# Patient Record
Sex: MALE | Race: BLACK OR AFRICAN AMERICAN | URBAN - METROPOLITAN AREA
[De-identification: names, ages, dates, MRNs, and addresses within clinical notes are randomized per-mention and may not be internally consistent; named-entity substitution may affect disease eponyms.]

---

## 2023-04-05 ENCOUNTER — OFFICE VISIT (OUTPATIENT)
Dept: URBAN - METROPOLITAN AREA CLINIC 6 | Facility: CLINIC | Age: 58
End: 2023-04-05
Payer: OTHER GOVERNMENT

## 2023-04-05 DIAGNOSIS — T15.02XA FOREIGN BODY IN CORNEA, LEFT EYE: Primary | ICD-10-CM

## 2023-04-05 PROCEDURE — 92002 INTRM OPH EXAM NEW PATIENT: CPT | Performed by: OPTOMETRIST

## 2023-04-05 RX ORDER — TOBRAMYCIN 3 MG/ML
0.3 % SOLUTION/ DROPS OPHTHALMIC
Qty: 5 | Refills: 0 | Status: ACTIVE
Start: 2023-04-05

## 2023-04-05 ASSESSMENT — INTRAOCULAR PRESSURE
OD: 14
OS: 12
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
## 2023-04-10 ENCOUNTER — OFFICE VISIT (OUTPATIENT)
Dept: URBAN - METROPOLITAN AREA CLINIC 6 | Facility: CLINIC | Age: 58
End: 2023-04-10
Payer: OTHER GOVERNMENT

## 2023-04-10 PROCEDURE — 92012 INTRM OPH EXAM EST PATIENT: CPT | Performed by: OPTOMETRIST

## 2023-04-10 RX ORDER — NEOMYCIN SULFATE, POLYMYXIN B SULFATE AND DEXAMETHASONE 3.5; 10000; 1 MG/G; [USP'U]/G; MG/G
OINTMENT OPHTHALMIC
Qty: 3.5 | Refills: 0 | Status: ACTIVE
Start: 2023-04-10

## 2023-04-10 ASSESSMENT — INTRAOCULAR PRESSURE
OD: 10
OS: 13

## 2023-04-10 NOTE — IMPRESSION/PLAN
Impression: Foreign body in cornea, left eye, subsequent encounter: T15.02XD. Plan: Corneal foreign body LT eye: healing seam centrally. BCL removed. Start maxitrol SAGAR QID OS. Discussed the importance of safety goggles.

## 2023-04-13 ENCOUNTER — OFFICE VISIT (OUTPATIENT)
Dept: URBAN - METROPOLITAN AREA CLINIC 6 | Facility: CLINIC | Age: 58
End: 2023-04-13
Payer: OTHER GOVERNMENT

## 2023-04-13 DIAGNOSIS — T15.02XD FOREIGN BODY IN CORNEA, LEFT EYE, SUBSEQUENT ENCOUNTER: Primary | ICD-10-CM

## 2023-04-13 PROCEDURE — 92012 INTRM OPH EXAM EST PATIENT: CPT | Performed by: OPTOMETRIST

## 2023-04-13 ASSESSMENT — INTRAOCULAR PRESSURE
OD: 10
OS: 13

## 2023-04-13 NOTE — IMPRESSION/PLAN
Impression: Foreign body in cornea, left eye, subsequent encounter: T15.02XD. Plan: Corneal foreign body LT eye: healing seam centrally. Improving. Continue maxitrol SAGAR TID OS. Recheck in 1 week.

## 2023-04-20 ENCOUNTER — OFFICE VISIT (OUTPATIENT)
Dept: URBAN - METROPOLITAN AREA CLINIC 6 | Facility: CLINIC | Age: 58
End: 2023-04-20
Payer: OTHER GOVERNMENT

## 2023-04-20 DIAGNOSIS — T15.02XD FOREIGN BODY IN CORNEA, LEFT EYE, SUBSEQUENT ENCOUNTER: Primary | ICD-10-CM

## 2023-04-20 PROCEDURE — 92012 INTRM OPH EXAM EST PATIENT: CPT | Performed by: OPTOMETRIST

## 2023-04-20 ASSESSMENT — INTRAOCULAR PRESSURE
OS: 16
OD: 12

## 2023-04-20 NOTE — IMPRESSION/PLAN
Impression: Foreign body in cornea, left eye, subsequent encounter: T15.02XD. Plan: Corneal foreign body LT eye: Healed seam centrally. Improving. Reduce maxitrol SAGAR to BID OS. Refer to Dr. Haylee Boyle for further evaluation.